# Patient Record
Sex: MALE | Race: WHITE | NOT HISPANIC OR LATINO
[De-identification: names, ages, dates, MRNs, and addresses within clinical notes are randomized per-mention and may not be internally consistent; named-entity substitution may affect disease eponyms.]

---

## 2017-04-26 ENCOUNTER — APPOINTMENT (OUTPATIENT)
Dept: DERMATOLOGY | Facility: CLINIC | Age: 67
End: 2017-04-26

## 2017-04-26 VITALS — SYSTOLIC BLOOD PRESSURE: 118 MMHG | DIASTOLIC BLOOD PRESSURE: 80 MMHG

## 2017-04-26 DIAGNOSIS — L57.0 ACTINIC KERATOSIS: ICD-10-CM

## 2017-04-26 DIAGNOSIS — L57.8 OTHER SKIN CHANGES DUE TO CHRONIC EXPOSURE TO NONIONIZING RADIATION: ICD-10-CM

## 2017-05-11 ENCOUNTER — APPOINTMENT (OUTPATIENT)
Dept: OPHTHALMOLOGY | Facility: CLINIC | Age: 67
End: 2017-05-11

## 2017-05-11 DIAGNOSIS — H35.3120 NONEXUDATIVE AGE-RELATED MACULAR DEGENERATION, LEFT EYE, STAGE UNSPECIFIED: ICD-10-CM

## 2017-06-15 ENCOUNTER — APPOINTMENT (OUTPATIENT)
Dept: OPHTHALMOLOGY | Facility: CLINIC | Age: 67
End: 2017-06-15

## 2018-06-05 ENCOUNTER — APPOINTMENT (OUTPATIENT)
Dept: OPHTHALMOLOGY | Facility: CLINIC | Age: 68
End: 2018-06-05
Payer: MEDICARE

## 2018-06-05 DIAGNOSIS — H35.3220 EXUDATIVE AGE-RELATED MACULAR DEGENERATION, LEFT EYE, STAGE UNSPECIFIED: ICD-10-CM

## 2018-06-05 DIAGNOSIS — H25.9 UNSPECIFIED AGE-RELATED CATARACT: ICD-10-CM

## 2018-06-05 DIAGNOSIS — H40.003 PREGLAUCOMA, UNSPECIFIED, BILATERAL: ICD-10-CM

## 2018-06-05 PROCEDURE — 92134 CPTRZ OPH DX IMG PST SGM RTA: CPT

## 2018-06-05 PROCEDURE — 92015 DETERMINE REFRACTIVE STATE: CPT

## 2018-06-05 PROCEDURE — 92014 COMPRE OPH EXAM EST PT 1/>: CPT

## 2018-06-20 ENCOUNTER — EMERGENCY (EMERGENCY)
Facility: HOSPITAL | Age: 68
LOS: 1 days | End: 2018-06-20
Attending: EMERGENCY MEDICINE
Payer: MEDICARE

## 2018-06-20 VITALS
TEMPERATURE: 99 F | RESPIRATION RATE: 18 BRPM | DIASTOLIC BLOOD PRESSURE: 81 MMHG | OXYGEN SATURATION: 99 % | WEIGHT: 216.05 LBS | HEART RATE: 91 BPM | SYSTOLIC BLOOD PRESSURE: 152 MMHG

## 2018-06-20 LAB
ALBUMIN SERPL ELPH-MCNC: 4.2 G/DL — SIGNIFICANT CHANGE UP (ref 3.3–5)
ALP SERPL-CCNC: 68 U/L — SIGNIFICANT CHANGE UP (ref 40–120)
ALT FLD-CCNC: 21 U/L — SIGNIFICANT CHANGE UP (ref 10–45)
ANION GAP SERPL CALC-SCNC: 14 MMOL/L — SIGNIFICANT CHANGE UP (ref 5–17)
APTT BLD: 23.8 SEC — LOW (ref 27.5–37.4)
AST SERPL-CCNC: 24 U/L — SIGNIFICANT CHANGE UP (ref 10–40)
BASOPHILS # BLD AUTO: 0.1 K/UL — SIGNIFICANT CHANGE UP (ref 0–0.2)
BASOPHILS NFR BLD AUTO: 0.7 % — SIGNIFICANT CHANGE UP (ref 0–2)
BILIRUB SERPL-MCNC: 0.6 MG/DL — SIGNIFICANT CHANGE UP (ref 0.2–1.2)
BLD GP AB SCN SERPL QL: NEGATIVE — SIGNIFICANT CHANGE UP
BUN SERPL-MCNC: 13 MG/DL — SIGNIFICANT CHANGE UP (ref 7–23)
CALCIUM SERPL-MCNC: 9 MG/DL — SIGNIFICANT CHANGE UP (ref 8.4–10.5)
CHLORIDE SERPL-SCNC: 105 MMOL/L — SIGNIFICANT CHANGE UP (ref 96–108)
CK MB CFR SERPL CALC: 1.8 NG/ML — SIGNIFICANT CHANGE UP (ref 0–6.7)
CK SERPL-CCNC: 79 U/L — SIGNIFICANT CHANGE UP (ref 30–200)
CO2 SERPL-SCNC: 24 MMOL/L — SIGNIFICANT CHANGE UP (ref 22–31)
CREAT SERPL-MCNC: 0.91 MG/DL — SIGNIFICANT CHANGE UP (ref 0.5–1.3)
EOSINOPHIL # BLD AUTO: 0.1 K/UL — SIGNIFICANT CHANGE UP (ref 0–0.5)
EOSINOPHIL NFR BLD AUTO: 0.6 % — SIGNIFICANT CHANGE UP (ref 0–6)
GLUCOSE SERPL-MCNC: 131 MG/DL — HIGH (ref 70–99)
HCT VFR BLD CALC: 46.3 % — SIGNIFICANT CHANGE UP (ref 39–50)
HGB BLD-MCNC: 15.7 G/DL — SIGNIFICANT CHANGE UP (ref 13–17)
INR BLD: 0.96 RATIO — SIGNIFICANT CHANGE UP (ref 0.88–1.16)
LYMPHOCYTES # BLD AUTO: 1.8 K/UL — SIGNIFICANT CHANGE UP (ref 1–3.3)
LYMPHOCYTES # BLD AUTO: 12.5 % — LOW (ref 13–44)
MCHC RBC-ENTMCNC: 32 PG — SIGNIFICANT CHANGE UP (ref 27–34)
MCHC RBC-ENTMCNC: 33.9 GM/DL — SIGNIFICANT CHANGE UP (ref 32–36)
MCV RBC AUTO: 94.4 FL — SIGNIFICANT CHANGE UP (ref 80–100)
MONOCYTES # BLD AUTO: 0.9 K/UL — SIGNIFICANT CHANGE UP (ref 0–0.9)
MONOCYTES NFR BLD AUTO: 6.4 % — SIGNIFICANT CHANGE UP (ref 2–14)
NEUTROPHILS # BLD AUTO: 11.8 K/UL — HIGH (ref 1.8–7.4)
NEUTROPHILS NFR BLD AUTO: 79.9 % — HIGH (ref 43–77)
PLATELET # BLD AUTO: 209 K/UL — SIGNIFICANT CHANGE UP (ref 150–400)
POTASSIUM SERPL-MCNC: 3.5 MMOL/L — SIGNIFICANT CHANGE UP (ref 3.5–5.3)
POTASSIUM SERPL-SCNC: 3.5 MMOL/L — SIGNIFICANT CHANGE UP (ref 3.5–5.3)
PROT SERPL-MCNC: 7.5 G/DL — SIGNIFICANT CHANGE UP (ref 6–8.3)
PROTHROM AB SERPL-ACNC: 10.4 SEC — SIGNIFICANT CHANGE UP (ref 9.8–12.7)
RBC # BLD: 4.9 M/UL — SIGNIFICANT CHANGE UP (ref 4.2–5.8)
RBC # FLD: 13.7 % — SIGNIFICANT CHANGE UP (ref 10.3–14.5)
RH IG SCN BLD-IMP: POSITIVE — SIGNIFICANT CHANGE UP
RH IG SCN BLD-IMP: POSITIVE — SIGNIFICANT CHANGE UP
SODIUM SERPL-SCNC: 143 MMOL/L — SIGNIFICANT CHANGE UP (ref 135–145)
TROPONIN T, HIGH SENSITIVITY RESULT: 6 NG/L — SIGNIFICANT CHANGE UP (ref 0–51)
WBC # BLD: 14.7 K/UL — HIGH (ref 3.8–10.5)
WBC # FLD AUTO: 14.7 K/UL — HIGH (ref 3.8–10.5)

## 2018-06-20 PROCEDURE — 99218: CPT

## 2018-06-20 PROCEDURE — 70450 CT HEAD/BRAIN W/O DYE: CPT | Mod: 26

## 2018-06-20 PROCEDURE — 99284 EMERGENCY DEPT VISIT MOD MDM: CPT

## 2018-06-20 PROCEDURE — 93010 ELECTROCARDIOGRAM REPORT: CPT

## 2018-06-20 PROCEDURE — 71045 X-RAY EXAM CHEST 1 VIEW: CPT | Mod: 26

## 2018-06-20 RX ORDER — SODIUM CHLORIDE 9 MG/ML
1000 INJECTION INTRAMUSCULAR; INTRAVENOUS; SUBCUTANEOUS ONCE
Qty: 0 | Refills: 0 | Status: COMPLETED | OUTPATIENT
Start: 2018-06-20 | End: 2018-06-20

## 2018-06-20 RX ORDER — SODIUM CHLORIDE 9 MG/ML
3 INJECTION INTRAMUSCULAR; INTRAVENOUS; SUBCUTANEOUS EVERY 8 HOURS
Qty: 0 | Refills: 0 | Status: DISCONTINUED | OUTPATIENT
Start: 2018-06-20 | End: 2018-06-24

## 2018-06-20 RX ADMIN — SODIUM CHLORIDE 1000 MILLILITER(S): 9 INJECTION INTRAMUSCULAR; INTRAVENOUS; SUBCUTANEOUS at 19:52

## 2018-06-20 RX ADMIN — SODIUM CHLORIDE 3 MILLILITER(S): 9 INJECTION INTRAMUSCULAR; INTRAVENOUS; SUBCUTANEOUS at 22:02

## 2018-06-20 NOTE — ED PROVIDER NOTE - OBJECTIVE STATEMENT
No signif PMHx p/w amnesia, per family at bedside pt went to take nap at 1pm, was at baseline, woke up at 2pm and had memory loss and mild confusion. This never happened before. No weakness, slurred speech, cough, fever, chills, n/v/d, or headache. Had glass of wine last night, no ETOH today. No drug use.

## 2018-06-20 NOTE — CONSULT NOTE ADULT - SUBJECTIVE AND OBJECTIVE BOX
Neurology Consult Note    Name  CHIQUI WEEKS    HPI:  Patient is a 67 year old male presenting with a sudden short term memory loss. Per family Hx, patient did not recall daughter's graduation yesterday, and other recent events, but did recall family member names, and distant memories.  Patient was constantly asking questions that he would have known the answers to.  Symptoms started at about 5PM, w/ patient being last normal at that time as well. Per EMS patient had one episode of non bloody emesis during ride to ED. On arrival patient noted to be A&OX2, to self and place only. Patient noted to be repeating himself during interview. Code stroke called upon arrival to ED, NIHSS 0, MRS 0, tPA not administered as patient with no symptoms.      Subjective:    Review of Systems:  Constitutional:        Eyes, Ears, Mouth, Throat:   Respiratory:                            Cardiovascular:   Gastrointestinal:                                     Genitourinary:   Musculoskeletal:                                    Dermatologic:   Neurological: as per above                                                                 Psychiatric:   Endocrine:              Hematologic/Lymphatic:     MEDICATIONS  (STANDING):  sodium chloride 0.9% Bolus 1000 milliLiter(s) IV Bolus once    MEDICATIONS  (PRN):      Allergies    No Known Allergies    Intolerances      PAST MEDICAL & SURGICAL HISTORY:  Adenoma of Large Intestine 5 yrs ago  History of Colonoscopy  Adenoma of Large Intestine resection  5 yrs ago    FH:  SH:    Objective:   Vital Signs Last 24 Hrs  T(C): 37.2 (20 Jun 2018 19:00), Max: 37.2 (20 Jun 2018 19:00)  T(F): 98.9 (20 Jun 2018 19:00), Max: 98.9 (20 Jun 2018 19:00)  HR: 91 (20 Jun 2018 19:00) (91 - 91)  BP: 152/81 (20 Jun 2018 19:00) (152/81 - 152/81)  BP(mean): --  RR: 18 (20 Jun 2018 19:00) (18 - 18)  SpO2: 99% (20 Jun 2018 19:00) (99% - 99%)    General Exam:   General appearance: No acute distress                   Neurological Exam:  Mental Status: Orientated to self, date and place.  Attention intact.  No dysarthria, aphasia or neglect.  Knowledge intact.  Registration intact.  Short and long term memory grossly intact.      Cranial Nerves: CN I - not tested.  PERRL, EOMI, VFF, no nystagmus or diplopia.  No APD.  Fundi not visualized bilaterally.  CN V1-3 intact to light touch and pinprick.  No facial asymmetry.  Hearing intact to finger rub bilaterally.  Tongue, uvula and palate midline.  Sternocleidomastoid and Trapezius intact bilaterally.    Motor:   Tone: normal.                  Strength:     [] Upper extremity                      Delt       Bicep    Tricep                                                  R         5/5 5/5        5/5       5/5                                               L          5/5        5/5        5/5       5/5  [] Lower extremity                       HF          KE          KF        DF         PF                                               R        5/5 5/5 5/5 5/5       5/5                                               L         5/5        5/5       5/5       5/5        5/5  Pronator drift: none                 Dysmetria: None to finger-nose-finger or heel-shin-heel  No truncal ataxia.    Tremor: No resting, postural or action tremor.  No myoclonus.    Sensation: intact to light touch, pinprick, vibration and proprioception    Deep Tendon Reflexes: 1+ bilateral biceps, triceps, brachioradialis, knee and ankle  Toes flexor bilaterally    Gait: normal and stable.        Other:                        15.7   14.7  )-----------( 209      ( 20 Jun 2018 19:07 )             46.3     06-20    143  |  105  |  13  ----------------------------<  131<H>  3.5   |  24  |  0.91    Ca    9.0      20 Jun 2018 19:07    TPro  7.5  /  Alb  4.2  /  TBili  0.6  /  DBili  x   /  AST  24  /  ALT  21  /  AlkPhos  68  06-20    LIVER FUNCTIONS - ( 20 Jun 2018 19:07 )  Alb: 4.2 g/dL / Pro: 7.5 g/dL / ALK PHOS: 68 U/L / ALT: 21 U/L / AST: 24 U/L / GGT: x           PT/INR - ( 20 Jun 2018 19:07 )   PT: 10.4 sec;   INR: 0.96 ratio         PTT - ( 20 Jun 2018 19:07 )  PTT:23.8 sec    Radiology    < from: CT Head No Cont (06.20.18 @ 19:00) >  IMPRESSION:    Normal non-contrast CT of the brain.      < end of copied text >

## 2018-06-20 NOTE — ED ADULT NURSE NOTE - CHPI ED SYMPTOMS NEG
no weakness/no vomiting/no nausea/no numbness/no dizziness/no fever/no blurred vision/no loss of consciousness

## 2018-06-20 NOTE — ED CDU PROVIDER INITIAL DAY NOTE - ATTENDING CONTRIBUTION TO CARE
I have personally performed a face to face diagnostic evaluation on this patient.  I have reviewed the ACP note and agree with the history, exam, and plan of care, except as noted.  History and Exam by me shows  see ER note for details

## 2018-06-20 NOTE — CONSULT NOTE ADULT - ASSESSMENT
67 year old male presenting with a sudden short term memory loss.  Neurological exam non-focal.  CTH showing no acute abnormalities.  Symptoms consistent with transient global amnesia.      Plan:  - observation to assess patient's memory recovery  - patient may be discharged when he feels that he has come back to his baseline  - patient should follow up with outpatient neurology Dr Escudero [phone] 67 year old male presenting with a sudden short term memory loss.  Neurological exam non-focal.  CTH showing no acute abnormalities.  Symptoms consistent with transient global amnesia.      Plan:  - MRI brain w/o contrast  - observation to assess patient's memory recovery  - patient may be discharged when he feels that he has come back to his baseline  - patient should follow up with outpatient neurology Dr Escudero [phone]

## 2018-06-20 NOTE — ED ADULT NURSE NOTE - OBJECTIVE STATEMENT
66 y/o male presenting to the ED via EMS complaining of sudden short term memory loss. Per family patient did not recall daughter's graduation yesterday. Symptoms started 1400, last known normal 1400. Per EMS patient had one episode of non bloody emesis during ride to ED. On arrival patient noted to be A&OX2, to self and place only. Patient noted to be repeating himself during interview. Code stroke initiated on arrival to ED at 1850. Patient brought directly to CT.  Neuro at bedside for assessment. FS completed 98. No slurred speech noted. No weakness noted. No facial droop noted. Patient denies chest pain, dizziness, nausea, diarrhea, numbness, fevers, tingling. CM in place. Safety and comfort measures provided.

## 2018-06-20 NOTE — ED CDU PROVIDER INITIAL DAY NOTE - DETAILS
R/o CVA  1. Frequent reevaluations  2. Telemetry  3. Neuro checks  4. MRI brain  5. Neuro following  Plan d/w Dr. Spence

## 2018-06-20 NOTE — ED CDU PROVIDER INITIAL DAY NOTE - OBJECTIVE STATEMENT
Patient is 67 y.o male w/ reported hx of adenoma large intestine presents  to ED accompanied by family w/ sudden short term memory loss. per family at bedside pt went to take nap at 1pm, was at baseline, woke up at 2pm and had memory loss and mild confusion. This never happened before. No weakness, slurred speech, cough, fever, chills, n/v/d, or headache. Had glass of wine last night, no ETOH today. No drug use.

## 2018-06-20 NOTE — ED CDU PROVIDER INITIAL DAY NOTE - PHYSICAL EXAMINATION
Gen: NAD  Eyes:  sclerae white, no icterus  ENT: Moist mucous membranes. No exudates  Neck: supple, no LAD, mass or goiter, trachea midline  CV: RRR. Audible S1 and S2. No murmurs, rubs, gallops, S3, nor S4  Pulm: Clear to auscultation bilaterally. No wheezes, rales, or rhonchi  Abd: BS+, nondistended, No tenderness to palpation  Musculoskeletal:  No edema  Skin: no lesions or scars noted  Psych: mood good, affect full range and congruent with mood.  Neurologic: Oriented to person and place, not to time, no focal deficits, cn 2-12 intact.

## 2018-06-20 NOTE — ED PROVIDER NOTE - MEDICAL DECISION MAKING DETAILS
Acute memory loss, last known normal at 2pm today, memory loss w/ no focal neuro deficits, NIH stroke scale = 0, code stroke called, CT neg for hemorrhage, possible TGA, will check labs, reassess. Acute memory loss, last known normal at 2pm today, memory loss w/ no focal neuro deficits, NIH stroke scale = 0, code stroke called, CT neg for hemorrhage, possible TGA, will check labs, reassess.  Jordy: patient bib ems as stroke notification for repetitive questioning and short term memory loss. NIH scale 0 on exam. code stroke upon arrival.  will ct head, neuro at bedside.

## 2018-06-20 NOTE — ED PROVIDER NOTE - CRITICAL CARE PROVIDED
documentation/interpretation of diagnostic studies/consult w/ pt's family directly relating to pts condition/direct patient care (not related to procedure)/additional history taking/consultation with other physicians

## 2018-06-20 NOTE — ED PROVIDER NOTE - PHYSICAL EXAMINATION
Gen: NAD  Eyes:  sclerae white, no icterus  ENT: Moist mucous membranes. No exudates  Neck: supple, no LAD, mass or goiter, trachea midline  CV: RRR. Audible S1 and S2. No murmurs, rubs, gallops, S3, nor S4  Pulm: Clear to auscultation bilaterally. No wheezes, rales, or rhonchi  Abd: BS+, nondistended, No tenderness to palpation  Musculoskeletal:  No edema  Skin: no lesions or scars noted  Psych: mood good, affect full range and congruent with mood.  Neurologic: Oriented to person and place, not to time Gen: NAD  Eyes:  sclerae white, no icterus  ENT: Moist mucous membranes. No exudates  Neck: supple, no LAD, mass or goiter, trachea midline  CV: RRR. Audible S1 and S2. No murmurs, rubs, gallops, S3, nor S4  Pulm: Clear to auscultation bilaterally. No wheezes, rales, or rhonchi  Abd: BS+, nondistended, No tenderness to palpation  Musculoskeletal:  No edema  Skin: no lesions or scars noted  Psych: mood good, affect full range and congruent with mood.  Neurologic: Oriented to person and place, not to time, no focal deficits, cn 2-12 intact.

## 2018-06-20 NOTE — CONSULT NOTE ADULT - ATTENDING COMMENTS
I performed a history and physical examination of the patient and discussed the management of the patient with the resident. I reviewed the resident's note and agree with the documented findings and plan of care with the following additions/exceptions.    The patient and his wife note that he had no prior problems with memory before this incident. 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 He took a nap at 2p after lunch and that is the last thing he recalls, doesn't recall that at 4:30/5p he called his wife to ask where she was, and he was confused as to why she was at one of their properties, asking questions he surely would have known answers to and repeating the same questions many times. His wife went home to him and called 911. He repetitively kept saying "must be a stroke" and other phrases regarding his trying to understand what was happeneing. No problem with word finding during this time. No paresthesias, weakness. He did have some nausea and vomit once. Around 7p his wife noted he was improving, he was 90% back to normal at 12:30a when she left the hospital, and when she returned to the ED at 8a he was fully back to normal. He cannot recall the details of what happened yesterday after he laid down for a nap but agrees he now feels normal.    On exam he was alert, fluent, able to provide his full history aside from amnestic period. Able to talk about current events easily. No anomia in conversation, comprehension was intact.  eomi. a few beats of end gaze nystagmus on right gaze. face moves symmetrically though questionable minimal dec in right nlf. no dysarthria. no drift. ffm intact. PF strong, foot tapping symmetric. gait steady, romberg neg. tone normal. ftn without dysmetria.    hct personally reviewed and appears normal.    suspect this was transient global amnesia, but waiting for MRI to r/o stroke and MRA to r/o stenosis that would suggest this was TIA instead.     elevated wbc count so would check ua. he reports tooth abscess treatment few weeks ago and completed antibiotics 10 days ago. denies any dental pain now, no sob, no dysuria, no fever or rashes, no recent travel. if he has mild infection somewhere could account for why he had the nausea and 1 e/o vomiting. if instead the n/v were indicative of posterior circulation ischemia would expect there to have been more persistent abnormalities and additional symptoms, but mri/a will show if anything abnormal.

## 2018-06-21 VITALS
OXYGEN SATURATION: 97 % | TEMPERATURE: 98 F | DIASTOLIC BLOOD PRESSURE: 77 MMHG | HEART RATE: 87 BPM | SYSTOLIC BLOOD PRESSURE: 124 MMHG | RESPIRATION RATE: 16 BRPM

## 2018-06-21 LAB
APPEARANCE UR: CLEAR — SIGNIFICANT CHANGE UP
BACTERIA # UR AUTO: NEGATIVE /HPF — SIGNIFICANT CHANGE UP
BILIRUB UR-MCNC: NEGATIVE — SIGNIFICANT CHANGE UP
COLOR SPEC: SIGNIFICANT CHANGE UP
DIFF PNL FLD: NEGATIVE — SIGNIFICANT CHANGE UP
EPI CELLS # UR: NEGATIVE /HPF — SIGNIFICANT CHANGE UP
GLUCOSE UR QL: NEGATIVE — SIGNIFICANT CHANGE UP
KETONES UR-MCNC: NEGATIVE — SIGNIFICANT CHANGE UP
LEUKOCYTE ESTERASE UR-ACNC: NEGATIVE — SIGNIFICANT CHANGE UP
NITRITE UR-MCNC: NEGATIVE — SIGNIFICANT CHANGE UP
PH UR: 5.5 — SIGNIFICANT CHANGE UP (ref 5–8)
PROT UR-MCNC: NEGATIVE — SIGNIFICANT CHANGE UP
RBC CASTS # UR COMP ASSIST: SIGNIFICANT CHANGE UP /HPF (ref 0–2)
SP GR SPEC: 1.01 — SIGNIFICANT CHANGE UP (ref 1.01–1.02)
UROBILINOGEN FLD QL: NEGATIVE — SIGNIFICANT CHANGE UP
WBC UR QL: SIGNIFICANT CHANGE UP /HPF (ref 0–5)

## 2018-06-21 PROCEDURE — A9585: CPT

## 2018-06-21 PROCEDURE — 70548 MR ANGIOGRAPHY NECK W/DYE: CPT | Mod: 26

## 2018-06-21 PROCEDURE — 84484 ASSAY OF TROPONIN QUANT: CPT

## 2018-06-21 PROCEDURE — 99291 CRITICAL CARE FIRST HOUR: CPT | Mod: 25

## 2018-06-21 PROCEDURE — 86901 BLOOD TYPING SEROLOGIC RH(D): CPT

## 2018-06-21 PROCEDURE — 86900 BLOOD TYPING SEROLOGIC ABO: CPT

## 2018-06-21 PROCEDURE — 80053 COMPREHEN METABOLIC PANEL: CPT

## 2018-06-21 PROCEDURE — 70548 MR ANGIOGRAPHY NECK W/DYE: CPT

## 2018-06-21 PROCEDURE — 86850 RBC ANTIBODY SCREEN: CPT

## 2018-06-21 PROCEDURE — 82962 GLUCOSE BLOOD TEST: CPT

## 2018-06-21 PROCEDURE — 82553 CREATINE MB FRACTION: CPT

## 2018-06-21 PROCEDURE — 70551 MRI BRAIN STEM W/O DYE: CPT

## 2018-06-21 PROCEDURE — 81001 URINALYSIS AUTO W/SCOPE: CPT

## 2018-06-21 PROCEDURE — 71045 X-RAY EXAM CHEST 1 VIEW: CPT

## 2018-06-21 PROCEDURE — 70551 MRI BRAIN STEM W/O DYE: CPT | Mod: 26

## 2018-06-21 PROCEDURE — 85730 THROMBOPLASTIN TIME PARTIAL: CPT

## 2018-06-21 PROCEDURE — 93005 ELECTROCARDIOGRAM TRACING: CPT

## 2018-06-21 PROCEDURE — 85027 COMPLETE CBC AUTOMATED: CPT

## 2018-06-21 PROCEDURE — 85610 PROTHROMBIN TIME: CPT

## 2018-06-21 PROCEDURE — 70450 CT HEAD/BRAIN W/O DYE: CPT

## 2018-06-21 PROCEDURE — 70544 MR ANGIOGRAPHY HEAD W/O DYE: CPT

## 2018-06-21 PROCEDURE — 82550 ASSAY OF CK (CPK): CPT

## 2018-06-21 PROCEDURE — 99217: CPT

## 2018-06-21 RX ADMIN — SODIUM CHLORIDE 3 MILLILITER(S): 9 INJECTION INTRAMUSCULAR; INTRAVENOUS; SUBCUTANEOUS at 06:22

## 2018-06-21 NOTE — ED CDU PROVIDER SUBSEQUENT DAY NOTE - MEDICAL DECISION MAKING DETAILS
Attending MD Portillo:  I have personally performed a face to face diagnostic evaluation on this patient.  I have reviewed the ACP note and agree with the history, exam, and plan of care, except as noted.  History and Exam by me shows Patient is 67 y.o male w/ reported hx of adenoma large intestine presents  to ED accompanied by family w/ sudden short term memory loss. per family at bedside pt went to take nap at 1pm, was at baseline, woke up at 2pm and had memory loss and mild confusion. Symptoms have since resolved and patient is at baseline.  Seen and evaluated by Neurology in ED.  In CDU for MRI and continued observation.

## 2018-06-21 NOTE — ED CDU PROVIDER DISPOSITION NOTE - PLAN OF CARE
1. Follow up with your PMD in 1-2 days. If you do not have a PMD you may follow up with our general internal medicine clinic ([phone]) for continued care. Additionally please follow up with outpatient neurology Dr Escudero [phone] within 2-3 days.   2. Show copies of your reports given to you. Take all of your other medications as previously prescribed.   3. If you develop any worsening or continued chest pain, shortness of breath, palpitations, weakness, nausea/vomiting, or for any other concerning symptoms please return to the ED immediately. 1. Follow up with your PMD in 1-2 days. If you do not have a PMD you may follow up with our general internal medicine clinic ([phone]) for continued care. Additionally please follow up with outpatient neurology Dr Escudero [phone] within 2-3 days, or with the neurology clinic [phone].  2. Show copies of your reports given to you. Take all of your other medications as previously prescribed.   3. If you develop any worsening or continued chest pain, shortness of breath, palpitations, weakness, nausea/vomiting, or for any other concerning symptoms please return to the ED immediately.

## 2018-06-21 NOTE — ED CDU PROVIDER SUBSEQUENT DAY NOTE - HISTORY
VSS NAD. Patient aox3, speaking full coherent sentences, no neuro deficit noted. Will continue to monitor

## 2018-06-21 NOTE — ED CDU PROVIDER SUBSEQUENT DAY NOTE - CRANIAL NERVE AND PUPILLARY EXAM
central and peripheral vision intact/gag reflex intact/tongue is midline/cranial nerves 2-12 intact/extra-ocular movements intact

## 2018-06-21 NOTE — ED CDU PROVIDER DISPOSITION NOTE - ATTENDING CONTRIBUTION TO CARE
Attending MD Portillo:   I personally have seen and examined this patient.  Physician assistant note reviewed and agree on plan of care and except where noted.  ED attending Lindsey note:  Patient re-evaluated and doing well.  No acute issues at  this time.  Lab and radiology tests reviewed with patient and/or family.  Patient stable for discharge.

## 2018-06-21 NOTE — ED ADULT NURSE REASSESSMENT NOTE - NS ED NURSE REASSESS COMMENT FT1
BABS HALE made aware of dysphagia incomplete; to be done by BABS HALE.
13.00  Pt was reassessed by Dr Lindsey ROSALES MD . Pt is discharged ML out . AMADA Vick  explained the discharge summary paper & follow up work up.  Pt verbalized the understanding on the same . Pt has steady gait stable vitals,  . Pt is stable to go home
07.00 Am   Received Report from BABS Tran   07.30 Am Pt is Reassessed. Pt denies CP SOB N/V/D fever chills pain headache dizziness memory intact A&OX4  obeys commands . Comfort care & safety measures continued, IV site looks clean dry intact, no signs of infiltration noted. Pt is awaiting for MRI Continue to  monitor
Pt AO4. Neuro check intact. No deficits noted. Pt able to answer questions correctly and confidently. Safety maintained. Will continue to monitor.
Pt received from BABS Emanuel. Pt oriented to CDU & plan of care was discussed. Pt AO2. Neuro check intact except pt has does not know correct year and has difficulty remembering month. No other deficits noted. As per family pt memory is gradually returning. Pt denies any numbness, tingling or headache. Safety & comfort measures maintained. Call bell in reach. Will continue to monitor.

## 2018-06-21 NOTE — ED CDU PROVIDER SUBSEQUENT DAY NOTE - PROGRESS NOTE DETAILS
Patient is resting comfortably and is without any complaints. Will continue to monitor Patient is ambulatory, aox3 speaking full coherent sentences w/ no signs of neuro deficit noted. will continue to monitor. Pt seen at bedside. Pt in NAD, comfortable. VS stable from last reading. Tele- sinus donny stable at ~58 since 0700, no other events . Pt has no complaints at this time. States his TGA has completely resolved. Pt A&Ox3, articulate speech without slurring or loss of words. Short-term memory intact. 5/5 strength, nl and equal sensation UE/LE b/l. Pt denies HA, change in vision, syncope, numbness, weakness, paresthesias, change in gait, chest/back/abdo pain, n/v/d.  Neuro requested the pt also get MRA Head/neck. Pt evaluated by neurology, they recommend UA, MRI brain, MRA head/neck. f/u outpt at [facility] [phone] Pt comfortable, NAD, VSS, no events on tele. MRI/MRA wnl, UA wnl. Discussed with neurology they state pt safe for DC. Discussed DC plan with pt. All questions answered. d/w Dr. Portillo, pt seen by attending. Pt stable and ready for DC. Pt evaluated by neurology, they recommend UA, MRI brain, MRA head/neck. f/u outpt at [facility] [phone]. Pt still doing well, comfortable, no sxs or complaints, VSS, no events on tele.

## 2018-06-21 NOTE — ED CDU PROVIDER DISPOSITION NOTE - CLINICAL COURSE
Patient is 67 y.o male w/ reported hx of adenoma large intestine presents  to ED accompanied by family w/ sudden short term memory loss. per family at bedside pt went to take nap at 1pm, was at baseline, woke up at 2pm and had memory loss and mild confusion. In ED stroke called initiated, patient had Head CT w/o contrast unremarkable and symptoms began to improve. Patient evaluated by Neurology w/ recc for further observation to access patient's memory recovery. Patient sent to CDU for neuro checks, frequent evaluations and MRI head. MRI head showed.... Patient is 67 y.o male w/ reported hx of adenoma large intestine presents  to ED accompanied by family w/ sudden short term memory loss. per family at bedside pt went to take nap at 1pm, was at baseline, woke up at 2pm and had memory loss and mild confusion. In ED stroke called initiated, patient had Head CT w/o contrast unremarkable and symptoms began to improve. Patient evaluated by Neurology w/ recc for further observation to access patient's memory recovery. Patient sent to CDU for neuro checks, frequent evaluations and MRI head. Pt was seen by neuro in CDU, they recommended UA, MRA head and neck, and f/u with neuro clinic. UA shows___. MRI showed____. MRA head/neck _____. Patient is 67 y.o male w/ reported hx of adenoma large intestine presents  to ED accompanied by family w/ sudden short term memory loss. per family at bedside pt went to take nap at 1pm, was at baseline, woke up at 2pm and had memory loss and mild confusion. In ED stroke called initiated, patient had Head CT w/o contrast unremarkable and symptoms began to improve. Patient evaluated by Neurology w/ recc for further observation to access patient's memory recovery. Patient sent to CDU for neuro checks, frequent evaluations and MRI head. Pt was seen by neuro in CDU, they recommended UA, MRA head and neck, and f/u with neuro clinic. UA negative. MRI wnl. MRA head/neck wnl. Pt is stable and ready for DC, case d/w Dr. Portillo, seen by attending. Neurology was called and informed of results, stated pt is safe for DC. Pt ready and stable for DC with neuro f/u oupt.

## 2018-06-21 NOTE — ED CDU PROVIDER SUBSEQUENT DAY NOTE - ATTENDING CONTRIBUTION TO CARE
Attending MD Portillo:   I personally have seen and examined this patient.  Physician assistant note reviewed and agree on plan of care and except where noted.  See MDM for details.

## 2018-09-10 ENCOUNTER — APPOINTMENT (OUTPATIENT)
Dept: OPHTHALMOLOGY | Facility: CLINIC | Age: 68
End: 2018-09-10

## 2019-03-29 NOTE — ED ADULT NURSE NOTE - PAIN RATING/NUMBER SCALE (0-10): ACTIVITY
[FreeTextEntry1] : URI. Followup with GI. Diet discussed. Followup if patient becomes symptomatic with any abdominal complaints..
0

## 2019-11-13 NOTE — ED ADULT NURSE REASSESSMENT NOTE - PSYCHOSOCIAL WDL
1300 Pt arrived from OR, rec'd report from . VSS. No s/s of resp distress. Pt denies pain at this time. Ace wrap to chest. Pt changed to cloth gown for comfort.     1325 Pt tolerating sips of water. Warm blankets for comfort. Daughter brought to bedside.     1345 Pt denies pain. Tolerating PO.     1415 Pt expressed readiness for d/c. Discussed d/c instructions with pt and pt's daughter. Answered all questions. Pt verbalized understanding. Pt taken out via wheelchair by this RN  
Alert and oriented x 3, normal mood and affect, no apparent risk to self or others.
Alert and oriented x 3, normal mood and affect, no apparent risk to self or others.

## 2021-01-28 ENCOUNTER — TRANSCRIPTION ENCOUNTER (OUTPATIENT)
Age: 71
End: 2021-01-28

## 2021-01-28 ENCOUNTER — NON-APPOINTMENT (OUTPATIENT)
Age: 71
End: 2021-01-28

## 2021-01-28 ENCOUNTER — APPOINTMENT (OUTPATIENT)
Dept: OPHTHALMOLOGY | Facility: CLINIC | Age: 71
End: 2021-01-28
Payer: MEDICARE

## 2021-01-28 PROCEDURE — 92014 COMPRE OPH EXAM EST PT 1/>: CPT

## 2021-01-28 PROCEDURE — 92133 CPTRZD OPH DX IMG PST SGM ON: CPT

## 2021-08-19 ENCOUNTER — TRANSCRIPTION ENCOUNTER (OUTPATIENT)
Age: 71
End: 2021-08-19

## 2021-08-27 ENCOUNTER — APPOINTMENT (OUTPATIENT)
Dept: OPHTHALMOLOGY | Facility: CLINIC | Age: 71
End: 2021-08-27

## 2022-01-24 ENCOUNTER — TRANSCRIPTION ENCOUNTER (OUTPATIENT)
Age: 72
End: 2022-01-24

## 2024-02-28 NOTE — ED CDU PROVIDER INITIAL DAY NOTE - NIH STROKE SCALE: 3. VISUAL
..  Risk  Yes No   Present to ED because of fall  X   Age > 70  X   Altered Mental Status    Intoxicated with Alcohol or    Substance Confusion  X   Impaired Mobility       Ambulance or transfers with  assistive devices or assist    Ambulates with unsteady gait and requires assistance     Unable to ambulate or transfer  X   Nursing Judgement (free text)        Yes to any risk = high fall risk    [ X] Fall Precautions In Place or Implemented per patients plan of care.  [ x] Bed Alarm (Active and Audible) pt educated on call light usage, to call RN when needed  [ x] Yellow Non-Skid Socks  [ x] Fall Risk Band  [x ] Bed in Lowest Position  [x ] Side Rail x2  [x ] Call Light Within Reach  [x ] Patient belongings within reach  [ ] Other: [ ].Fall Precautions In Place or Implemented per patients plan of care.      (0) No visual loss

## 2025-01-14 ENCOUNTER — APPOINTMENT (OUTPATIENT)
Facility: CLINIC | Age: 75
End: 2025-01-14
Payer: MEDICARE

## 2025-01-14 VITALS
RESPIRATION RATE: 16 BRPM | BODY MASS INDEX: 25.77 KG/M2 | WEIGHT: 180 LBS | SYSTOLIC BLOOD PRESSURE: 142 MMHG | OXYGEN SATURATION: 97 % | DIASTOLIC BLOOD PRESSURE: 84 MMHG | HEART RATE: 72 BPM | HEIGHT: 70 IN

## 2025-01-14 DIAGNOSIS — K86.81 EXOCRINE PANCREATIC INSUFFICIENCY: ICD-10-CM

## 2025-01-14 DIAGNOSIS — Z85.828 PERSONAL HISTORY OF OTHER MALIGNANT NEOPLASM OF SKIN: ICD-10-CM

## 2025-01-14 DIAGNOSIS — F10.90 ALCOHOL USE, UNSPECIFIED, UNCOMPLICATED: ICD-10-CM

## 2025-01-14 DIAGNOSIS — Z86.69 PERSONAL HISTORY OF OTHER DISEASES OF THE NERVOUS SYSTEM AND SENSE ORGANS: ICD-10-CM

## 2025-01-14 DIAGNOSIS — R10.33 PERIUMBILICAL PAIN: ICD-10-CM

## 2025-01-14 DIAGNOSIS — Z87.891 PERSONAL HISTORY OF NICOTINE DEPENDENCE: ICD-10-CM

## 2025-01-14 PROCEDURE — 99203 OFFICE O/P NEW LOW 30 MIN: CPT

## 2025-01-18 ENCOUNTER — APPOINTMENT (OUTPATIENT)
Dept: CT IMAGING | Facility: CLINIC | Age: 75
End: 2025-01-18
Payer: MEDICARE

## 2025-01-18 ENCOUNTER — OUTPATIENT (OUTPATIENT)
Dept: OUTPATIENT SERVICES | Facility: HOSPITAL | Age: 75
LOS: 1 days | End: 2025-01-18
Payer: MEDICARE

## 2025-01-18 DIAGNOSIS — R10.33 PERIUMBILICAL PAIN: ICD-10-CM

## 2025-01-18 PROCEDURE — 74160 CT ABDOMEN W/CONTRAST: CPT | Mod: 26

## 2025-01-18 PROCEDURE — 74160 CT ABDOMEN W/CONTRAST: CPT

## 2025-01-22 ENCOUNTER — NON-APPOINTMENT (OUTPATIENT)
Age: 75
End: 2025-01-22

## 2025-01-22 DIAGNOSIS — R19.7 DIARRHEA, UNSPECIFIED: ICD-10-CM

## 2025-01-22 DIAGNOSIS — K86.89 OTHER SPECIFIED DISEASES OF PANCREAS: ICD-10-CM

## 2025-02-28 ENCOUNTER — OUTPATIENT (OUTPATIENT)
Dept: OUTPATIENT SERVICES | Facility: HOSPITAL | Age: 75
LOS: 1 days | End: 2025-02-28
Payer: MEDICARE

## 2025-02-28 VITALS
WEIGHT: 182.98 LBS | DIASTOLIC BLOOD PRESSURE: 84 MMHG | HEIGHT: 70 IN | RESPIRATION RATE: 16 BRPM | HEART RATE: 79 BPM | OXYGEN SATURATION: 100 % | SYSTOLIC BLOOD PRESSURE: 120 MMHG | TEMPERATURE: 98 F

## 2025-02-28 DIAGNOSIS — Z01.818 ENCOUNTER FOR OTHER PREPROCEDURAL EXAMINATION: ICD-10-CM

## 2025-02-28 DIAGNOSIS — Z85.828 PERSONAL HISTORY OF OTHER MALIGNANT NEOPLASM OF SKIN: Chronic | ICD-10-CM

## 2025-02-28 DIAGNOSIS — K86.81 EXOCRINE PANCREATIC INSUFFICIENCY: ICD-10-CM

## 2025-02-28 DIAGNOSIS — Z87.898 PERSONAL HISTORY OF OTHER SPECIFIED CONDITIONS: ICD-10-CM

## 2025-02-28 DIAGNOSIS — Z98.49 CATARACT EXTRACTION STATUS, UNSPECIFIED EYE: Chronic | ICD-10-CM

## 2025-02-28 LAB
ANION GAP SERPL CALC-SCNC: 5 MMOL/L — SIGNIFICANT CHANGE UP (ref 5–17)
APTT BLD: 29.1 SEC — SIGNIFICANT CHANGE UP (ref 24.5–35.6)
BASOPHILS # BLD AUTO: 0.11 K/UL — SIGNIFICANT CHANGE UP (ref 0–0.2)
BASOPHILS NFR BLD AUTO: 1.3 % — SIGNIFICANT CHANGE UP (ref 0–2)
BUN SERPL-MCNC: 17 MG/DL — SIGNIFICANT CHANGE UP (ref 7–23)
CALCIUM SERPL-MCNC: 10.1 MG/DL — SIGNIFICANT CHANGE UP (ref 8.5–10.1)
CHLORIDE SERPL-SCNC: 110 MMOL/L — HIGH (ref 96–108)
CO2 SERPL-SCNC: 27 MMOL/L — SIGNIFICANT CHANGE UP (ref 22–31)
CREAT SERPL-MCNC: 0.98 MG/DL — SIGNIFICANT CHANGE UP (ref 0.5–1.3)
EGFR: 80 ML/MIN/1.73M2 — SIGNIFICANT CHANGE UP
EOSINOPHIL # BLD AUTO: 0.17 K/UL — SIGNIFICANT CHANGE UP (ref 0–0.5)
EOSINOPHIL NFR BLD AUTO: 2 % — SIGNIFICANT CHANGE UP (ref 0–6)
GLUCOSE SERPL-MCNC: 99 MG/DL — SIGNIFICANT CHANGE UP (ref 70–99)
HCT VFR BLD CALC: 47.4 % — SIGNIFICANT CHANGE UP (ref 39–50)
HGB BLD-MCNC: 15.9 G/DL — SIGNIFICANT CHANGE UP (ref 13–17)
IMM GRANULOCYTES # BLD AUTO: 0.03 K/UL — SIGNIFICANT CHANGE UP (ref 0–0.07)
IMM GRANULOCYTES NFR BLD AUTO: 0.4 % — SIGNIFICANT CHANGE UP (ref 0–0.9)
INR BLD: 0.91 RATIO — SIGNIFICANT CHANGE UP (ref 0.85–1.16)
LYMPHOCYTES # BLD AUTO: 2.52 K/UL — SIGNIFICANT CHANGE UP (ref 1–3.3)
LYMPHOCYTES NFR BLD AUTO: 29.4 % — SIGNIFICANT CHANGE UP (ref 13–44)
MCHC RBC-ENTMCNC: 31.2 PG — SIGNIFICANT CHANGE UP (ref 27–34)
MCHC RBC-ENTMCNC: 33.5 G/DL — SIGNIFICANT CHANGE UP (ref 32–36)
MCV RBC AUTO: 92.9 FL — SIGNIFICANT CHANGE UP (ref 80–100)
MONOCYTES # BLD AUTO: 0.9 K/UL — SIGNIFICANT CHANGE UP (ref 0–0.9)
MONOCYTES NFR BLD AUTO: 10.5 % — SIGNIFICANT CHANGE UP (ref 2–14)
NEUTROPHILS # BLD AUTO: 4.83 K/UL — SIGNIFICANT CHANGE UP (ref 1.8–7.4)
NEUTROPHILS NFR BLD AUTO: 56.4 % — SIGNIFICANT CHANGE UP (ref 43–77)
NRBC # BLD AUTO: 0 K/UL — SIGNIFICANT CHANGE UP (ref 0–0)
NRBC # FLD: 0 K/UL — SIGNIFICANT CHANGE UP (ref 0–0)
NRBC BLD AUTO-RTO: 0 /100 WBCS — SIGNIFICANT CHANGE UP (ref 0–0)
PLATELET # BLD AUTO: 235 K/UL — SIGNIFICANT CHANGE UP (ref 150–400)
PMV BLD: 9.2 FL — SIGNIFICANT CHANGE UP (ref 7–13)
POTASSIUM SERPL-MCNC: 4.1 MMOL/L — SIGNIFICANT CHANGE UP (ref 3.5–5.3)
POTASSIUM SERPL-SCNC: 4.1 MMOL/L — SIGNIFICANT CHANGE UP (ref 3.5–5.3)
PROTHROM AB SERPL-ACNC: 10.8 SEC — SIGNIFICANT CHANGE UP (ref 9.9–13.4)
RBC # BLD: 5.1 M/UL — SIGNIFICANT CHANGE UP (ref 4.2–5.8)
RBC # FLD: 14.3 % — SIGNIFICANT CHANGE UP (ref 10.3–14.5)
SODIUM SERPL-SCNC: 142 MMOL/L — SIGNIFICANT CHANGE UP (ref 135–145)
WBC # BLD: 8.56 K/UL — SIGNIFICANT CHANGE UP (ref 3.8–10.5)
WBC # FLD AUTO: 8.56 K/UL — SIGNIFICANT CHANGE UP (ref 3.8–10.5)

## 2025-02-28 PROCEDURE — 99214 OFFICE O/P EST MOD 30 MIN: CPT | Mod: 25

## 2025-02-28 PROCEDURE — 93005 ELECTROCARDIOGRAM TRACING: CPT

## 2025-02-28 PROCEDURE — 85610 PROTHROMBIN TIME: CPT

## 2025-02-28 PROCEDURE — 93010 ELECTROCARDIOGRAM REPORT: CPT

## 2025-02-28 PROCEDURE — 85025 COMPLETE CBC W/AUTO DIFF WBC: CPT

## 2025-02-28 PROCEDURE — 36415 COLL VENOUS BLD VENIPUNCTURE: CPT

## 2025-02-28 PROCEDURE — 85730 THROMBOPLASTIN TIME PARTIAL: CPT

## 2025-02-28 PROCEDURE — 80048 BASIC METABOLIC PNL TOTAL CA: CPT

## 2025-02-28 NOTE — H&P PST ADULT - NSICDXFAMILYHX_GEN_ALL_CORE_FT
FAMILY HISTORY:  Father  Still living? Unknown  FHx: heart disease, Age at diagnosis: Age Unknown  FHx: prostate cancer, Age at diagnosis: Age Unknown

## 2025-02-28 NOTE — H&P PST ADULT - NSICDXPASTSURGICALHX_GEN_ALL_CORE_FT
PAST SURGICAL HISTORY:  Adenoma of Large Intestine resection  5 yrs ago     History of Colonoscopy     History of skin cancer     S/P cataract extraction

## 2025-02-28 NOTE — H&P PST ADULT - NSICDXPASTMEDICALHX_GEN_ALL_CORE_FT
PAST MEDICAL HISTORY:  Adenoma of Large Intestine 5 yrs ago     H/O abdominal pain     Skin cancer, basal cell

## 2025-02-28 NOTE — H&P PST ADULT - NS HP PST LATEX ALLERGY
Per RP   Called patient and offered him the 8:30 slot on 1/10   Patient stated he will ask his mom for a ride and he will give us a call back   Appointment slot is currently on hold for patient No

## 2025-02-28 NOTE — H&P PST ADULT - NSICDXPROCEDURE_GEN_ALL_CORE_FT
PROCEDURES:  Colonoscopy 28-Feb-2025 08:21:06  Sharon Shaw  Upper endoscopy 28-Feb-2025 08:22:12  Sharon Shaw

## 2025-02-28 NOTE — H&P PST ADULT - HISTORY OF PRESENT ILLNESS
73 y/o male presents to [facility] for schedule colonoscopy/ endoscopy on 3/5/25. Patient reports abdominal pain associated with episodes of loose stool over the last 8 months. Followed by gastroenterologist and diagnostic testing done referred to surgeon for abnormal results.

## 2025-02-28 NOTE — H&P PST ADULT - NSANTHOSAYNRD_GEN_A_CORE
No. NANI screening performed.  STOP BANG Legend: 0-2 = LOW Risk; 3-4 = INTERMEDIATE Risk; 5-8 = HIGH Risk

## 2025-03-01 DIAGNOSIS — Z01.818 ENCOUNTER FOR OTHER PREPROCEDURAL EXAMINATION: ICD-10-CM

## 2025-03-01 DIAGNOSIS — K86.81 EXOCRINE PANCREATIC INSUFFICIENCY: ICD-10-CM

## 2025-03-04 NOTE — ASU PATIENT PROFILE, ADULT - NSICDXPASTMEDICALHX_GEN_ALL_CORE_FT
PAST MEDICAL HISTORY:  Adenoma of Large Intestine 5 yrs ago     Colon polyp     H/O abdominal pain     High cholesterol     HLD (hyperlipidemia)     Macular degeneration     Skin cancer, basal cell      PAST MEDICAL HISTORY:  Adenoma of Large Intestine 5 yrs ago     Colon polyp     H/O abdominal pain     H/O sciatica     High cholesterol     HLD (hyperlipidemia)     Macular degeneration     Skin cancer, basal cell
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
## 2025-03-04 NOTE — ASU PATIENT PROFILE, ADULT - NSICDXPASTSURGICALHX_GEN_ALL_CORE_FT
PAST SURGICAL HISTORY:  Adenoma of Large Intestine resection  5 yrs ago     History of Colonoscopy     History of skin cancer     S/P cataract extraction      PAST SURGICAL HISTORY:  Adenoma of Large Intestine resection  5 yrs ago     History of Colonoscopy     History of skin cancer     S/P cataract extraction bilateral

## 2025-03-04 NOTE — ASU PATIENT PROFILE, ADULT - FALL HARM RISK - PATIENT NEEDS ASSISTANCE
Ambulate with supportive shoes and inserts and avoid walking barefoot. Check feet daily. Apply urea cream to preulcerative callus sites. Follow-up in 6 weeks or sooner if other concerns arise.
No assistance needed

## 2025-03-05 ENCOUNTER — OUTPATIENT (OUTPATIENT)
Dept: INPATIENT UNIT | Facility: HOSPITAL | Age: 75
LOS: 1 days | Discharge: HOME | End: 2025-03-05
Payer: MEDICARE

## 2025-03-05 ENCOUNTER — TRANSCRIPTION ENCOUNTER (OUTPATIENT)
Age: 75
End: 2025-03-05

## 2025-03-05 ENCOUNTER — RESULT REVIEW (OUTPATIENT)
Age: 75
End: 2025-03-05

## 2025-03-05 ENCOUNTER — APPOINTMENT (OUTPATIENT)
Dept: GASTROENTEROLOGY | Facility: HOSPITAL | Age: 75
End: 2025-03-05

## 2025-03-05 VITALS
TEMPERATURE: 98 F | SYSTOLIC BLOOD PRESSURE: 100 MMHG | OXYGEN SATURATION: 100 % | HEART RATE: 57 BPM | DIASTOLIC BLOOD PRESSURE: 68 MMHG | RESPIRATION RATE: 18 BRPM

## 2025-03-05 VITALS
WEIGHT: 179.9 LBS | SYSTOLIC BLOOD PRESSURE: 126 MMHG | RESPIRATION RATE: 16 BRPM | DIASTOLIC BLOOD PRESSURE: 84 MMHG | HEART RATE: 75 BPM | OXYGEN SATURATION: 99 % | HEIGHT: 70 IN | TEMPERATURE: 98 F

## 2025-03-05 DIAGNOSIS — Z98.49 CATARACT EXTRACTION STATUS, UNSPECIFIED EYE: Chronic | ICD-10-CM

## 2025-03-05 DIAGNOSIS — K86.81 EXOCRINE PANCREATIC INSUFFICIENCY: ICD-10-CM

## 2025-03-05 DIAGNOSIS — R10.33 PERIUMBILICAL PAIN: ICD-10-CM

## 2025-03-05 DIAGNOSIS — Z85.828 PERSONAL HISTORY OF OTHER MALIGNANT NEOPLASM OF SKIN: Chronic | ICD-10-CM

## 2025-03-05 PROCEDURE — 88305 TISSUE EXAM BY PATHOLOGIST: CPT | Mod: 26

## 2025-03-05 PROCEDURE — 88312 SPECIAL STAINS GROUP 1: CPT

## 2025-03-05 PROCEDURE — 88312 SPECIAL STAINS GROUP 1: CPT | Mod: 26

## 2025-03-05 PROCEDURE — 43239 EGD BIOPSY SINGLE/MULTIPLE: CPT

## 2025-03-05 PROCEDURE — 45385 COLONOSCOPY W/LESION REMOVAL: CPT

## 2025-03-05 PROCEDURE — 43237 ENDOSCOPIC US EXAM ESOPH: CPT

## 2025-03-05 PROCEDURE — 45380 COLONOSCOPY AND BIOPSY: CPT | Mod: 59

## 2025-03-05 PROCEDURE — 88305 TISSUE EXAM BY PATHOLOGIST: CPT

## 2025-03-05 RX ORDER — CYANOCOBALAMIN 1000 UG/ML
1 INJECTION INTRAMUSCULAR; SUBCUTANEOUS
Refills: 0 | DISCHARGE

## 2025-03-05 RX ORDER — SODIUM CHLORIDE 9 G/1000ML
1000 INJECTION, SOLUTION INTRAVENOUS
Refills: 0 | Status: DISCONTINUED | OUTPATIENT
Start: 2025-03-05 | End: 2025-03-05

## 2025-03-05 RX ORDER — ATORVASTATIN CALCIUM 80 MG/1
1 TABLET, FILM COATED ORAL
Refills: 0 | DISCHARGE

## 2025-03-05 NOTE — ASU DISCHARGE PLAN (ADULT/PEDIATRIC) - FINANCIAL ASSISTANCE
[facility] provides services at a reduced cost to those who are determined to be eligible through [facility]’s financial assistance program. Information regarding [facility]’s financial assistance program can be found by going to [email or web address] or by calling [phone].

## 2025-03-05 NOTE — ASU PREOP CHECKLIST - AICD PRESENT
Problem: At Risk for Falls  Goal: # Patient does not fall  Outcome: Outcome Met, Continue evaluating goal progress toward completion  Goal: # Takes action to control fall-related risks  Outcome: Outcome Met, Continue evaluating goal progress toward completion     Problem: VTE, Risk for  Goal: # No s/s of VTE  Outcome: Outcome Met, Continue evaluating goal progress toward completion     Problem: Pain  Goal: #Acceptable pain level achieved/maintained at rest using NRS/Faces  Description: This goal is used for patients who can self-report.  Acceptable means the level is at or below the identified comfort/function goal.  Outcome: Outcome Met, Continue evaluating goal progress toward completion  Goal: # Acceptable pain level achieved/maintained at rest using NRS/Faces without oversedation (opioid naive or PCA/Epidural infusion)  Description: This goal is used if Opioid-naïve or on PCA/Epidural Infusion.  Outcome: Outcome Met, Continue evaluating goal progress toward completion      no

## 2025-03-10 LAB — SURGICAL PATHOLOGY STUDY: SIGNIFICANT CHANGE UP

## 2025-03-11 DIAGNOSIS — D12.0 BENIGN NEOPLASM OF CECUM: ICD-10-CM

## 2025-03-11 DIAGNOSIS — R10.33 PERIUMBILICAL PAIN: ICD-10-CM

## 2025-03-11 DIAGNOSIS — R19.7 DIARRHEA, UNSPECIFIED: ICD-10-CM

## 2025-03-11 DIAGNOSIS — K86.89 OTHER SPECIFIED DISEASES OF PANCREAS: ICD-10-CM

## 2025-03-11 DIAGNOSIS — Z86.0101 PERSONAL HISTORY OF ADENOMATOUS AND SERRATED COLON POLYPS: ICD-10-CM

## 2025-03-11 DIAGNOSIS — Z90.49 ACQUIRED ABSENCE OF OTHER SPECIFIED PARTS OF DIGESTIVE TRACT: ICD-10-CM

## 2025-03-11 DIAGNOSIS — K29.50 UNSPECIFIED CHRONIC GASTRITIS WITHOUT BLEEDING: ICD-10-CM

## 2025-03-11 DIAGNOSIS — Z87.891 PERSONAL HISTORY OF NICOTINE DEPENDENCE: ICD-10-CM

## 2025-03-21 PROBLEM — C44.91 BASAL CELL CARCINOMA OF SKIN, UNSPECIFIED: Chronic | Status: ACTIVE | Noted: 2025-02-28

## 2025-03-21 PROBLEM — K63.5 POLYP OF COLON: Chronic | Status: ACTIVE | Noted: 2025-03-04

## 2025-03-21 PROBLEM — H35.30 UNSPECIFIED MACULAR DEGENERATION: Chronic | Status: ACTIVE | Noted: 2025-03-04

## 2025-03-21 PROBLEM — Z87.898 PERSONAL HISTORY OF OTHER SPECIFIED CONDITIONS: Chronic | Status: ACTIVE | Noted: 2025-02-28

## 2025-03-21 PROBLEM — E78.5 HYPERLIPIDEMIA, UNSPECIFIED: Chronic | Status: ACTIVE | Noted: 2025-02-28

## 2025-03-21 PROBLEM — E78.00 PURE HYPERCHOLESTEROLEMIA, UNSPECIFIED: Chronic | Status: ACTIVE | Noted: 2025-03-04

## 2025-03-21 PROBLEM — Z86.69 PERSONAL HISTORY OF OTHER DISEASES OF THE NERVOUS SYSTEM AND SENSE ORGANS: Chronic | Status: ACTIVE | Noted: 2025-03-05

## 2025-03-25 ENCOUNTER — APPOINTMENT (OUTPATIENT)
Facility: CLINIC | Age: 75
End: 2025-03-25
Payer: MEDICARE

## 2025-03-25 DIAGNOSIS — R19.7 DIARRHEA, UNSPECIFIED: ICD-10-CM

## 2025-03-25 DIAGNOSIS — K63.5 POLYP OF COLON: ICD-10-CM

## 2025-03-25 DIAGNOSIS — K86.89 OTHER SPECIFIED DISEASES OF PANCREAS: ICD-10-CM

## 2025-03-25 DIAGNOSIS — R10.33 PERIUMBILICAL PAIN: ICD-10-CM

## 2025-03-25 DIAGNOSIS — K86.81 EXOCRINE PANCREATIC INSUFFICIENCY: ICD-10-CM

## 2025-03-25 PROCEDURE — 99214 OFFICE O/P EST MOD 30 MIN: CPT | Mod: 2W

## 2025-03-27 NOTE — ED PROVIDER NOTE - NS ED MD DISPO OBSERVATION SRV
Medication: levothyroxine (Synthroid) 25 mcg tablet     Dose/Frequency: Take 1 tablet (25 mcg total) by mouth daily     Quantity: 90    Pharmacy: [facility] [place]    Office:   [x] PCP/Provider -   [] Speciality/Provider -     Does the patient have enough for 3 days?   [] Yes   [] No - Send as HP to POD      CDU

## 2025-04-24 NOTE — ASU DISCHARGE PLAN (ADULT/PEDIATRIC) - BATHING
This patient contacted the office for the following prescriptions to be refilled:    Medication requested :   Requested Prescriptions     Pending Prescriptions Disp Refills    amLODIPine (NORVASC) 2.5 MG tablet [Pharmacy Med Name: AMLODIPINE BESYLATE 2.5 MG TAB] 90 tablet 1     Sig: TAKE 1 TABLET BY MOUTH DAILY    levothyroxine (SYNTHROID) 50 MCG tablet [Pharmacy Med Name: LEVOTHYROXINE 50 MCG TABLET] 90 tablet 1     Sig: TAKE ONE TABLET BY MOUTH EVERY MORNING BEFORE BREAKFAST    atorvastatin (LIPITOR) 40 MG tablet [Pharmacy Med Name: ATORVASTATIN 40 MG TABLET] 30 tablet 0     Sig: TAKE 1 TABLET BY MOUTH AT BEDTIME    aspirin (ASPIRIN LOW DOSE) 81 MG chewable tablet [Pharmacy Med Name: ASPIRIN 81 MG CHEWABLE TABLET] 30 tablet 3     Sig: CHEW 1 TABLET BY MOUTH DAILY        Last Refilled:  8/5/2024, Lipitor 12/28/2024    Last Office Visit: 7/11/2024    Next Office Visit: Visit date not found, overdue    Last Labs: none, ordered but never done     No change

## 2025-05-17 ENCOUNTER — NON-APPOINTMENT (OUTPATIENT)
Age: 75
End: 2025-05-17

## 2025-05-19 ENCOUNTER — APPOINTMENT (OUTPATIENT)
Dept: ORTHOPEDIC SURGERY | Facility: CLINIC | Age: 75
End: 2025-05-19
Payer: MEDICARE

## 2025-05-19 ENCOUNTER — NON-APPOINTMENT (OUTPATIENT)
Age: 75
End: 2025-05-19

## 2025-05-19 VITALS — WEIGHT: 180 LBS | BODY MASS INDEX: 25.77 KG/M2 | HEIGHT: 70 IN

## 2025-05-19 DIAGNOSIS — M16.11 UNILATERAL PRIMARY OSTEOARTHRITIS, RIGHT HIP: ICD-10-CM

## 2025-05-19 PROCEDURE — 72110 X-RAY EXAM L-2 SPINE 4/>VWS: CPT

## 2025-05-19 PROCEDURE — 99204 OFFICE O/P NEW MOD 45 MIN: CPT

## 2025-05-19 PROCEDURE — 72170 X-RAY EXAM OF PELVIS: CPT
